# Patient Record
Sex: FEMALE | Race: WHITE
[De-identification: names, ages, dates, MRNs, and addresses within clinical notes are randomized per-mention and may not be internally consistent; named-entity substitution may affect disease eponyms.]

---

## 2018-02-13 ENCOUNTER — HOSPITAL ENCOUNTER (EMERGENCY)
Dept: HOSPITAL 25 - UCCORT | Age: 23
Discharge: HOME | End: 2018-02-13
Payer: COMMERCIAL

## 2018-02-13 VITALS — DIASTOLIC BLOOD PRESSURE: 82 MMHG | SYSTOLIC BLOOD PRESSURE: 127 MMHG

## 2018-02-13 DIAGNOSIS — B34.9: Primary | ICD-10-CM

## 2018-02-13 PROCEDURE — G0463 HOSPITAL OUTPT CLINIC VISIT: HCPCS

## 2018-02-13 PROCEDURE — 99201: CPT

## 2018-02-13 PROCEDURE — 87502 INFLUENZA DNA AMP PROBE: CPT

## 2018-02-13 NOTE — UC
Throat Pain/Nasal Rodolfo HPI





- HPI Summary


HPI Summary: 





sore throat x 3 days


+ fever, chills, body aches, 


+ cough , chest congestion 





- History of Current Complaint


Chief Complaint: UCRespiratory


Stated Complaint: CHEST RODOLFO/ST/RUNNY NOSE


Time Seen by Provider: 02/13/18 09:50


Hx Obtained From: Patient


Hx Last Menstrual Period: 02/01/18


Onset/Duration: Gradual Onset, Lasting Days - 3, Still Present


Severity: Moderate


Pain Intensity: 4


Cough: Nonproductive


Associated Signs & Symptoms: Positive: Nasal Discharge, Fever.  Negative: 

Wheezing, Rash





- Allergies/Home Medications


Allergies/Adverse Reactions: 


 Allergies











Allergy/AdvReac Type Severity Reaction Status Date / Time


 


No Known Allergies Allergy   Verified 02/13/18 09:35











Home Medications: 


 Home Medications





NK [No Home Medications Reported]  02/13/18 [History Confirmed 02/13/18]











PMH/Surg Hx/FS Hx/Imm Hx


Previously Healthy: Yes





- Surgical History


Surgical History: Yes


Surgery Procedure, Year, and Place: WISDOM TEETH REMOVED





- Family History


Known Family History: 


   Negative: Diabetes





- Social History


Alcohol Use: Weekly


Substance Use Type: None


Smoking Status (MU): Never Smoked Tobacco





Review of Systems


Constitutional: Fever, Chills, Fatigue


Skin: Negative


Eyes: Negative


ENT: Sore Throat, Nasal Discharge


Respiratory: Cough


Cardiovascular: Negative


Gastrointestinal: Negative


Genitourinary: Negative


Is Patient Immunocompromised?: No


All Other Systems Reviewed And Are Negative: Yes





Physical Exam


Triage Information Reviewed: Yes


Appearance: Well-Appearing, No Pain Distress, Well-Nourished


Vital Signs: 


 Initial Vital Signs











Temp  98.5 F   02/13/18 09:35


 


Pulse  75   02/13/18 09:35


 


Resp  18   02/13/18 09:35


 


BP  127/82   02/13/18 09:35


 


Pulse Ox  99   02/13/18 09:35











Vital Signs Reviewed: Yes


Eye Exam: Normal


Eyes: Positive: Conjunctiva Clear


ENT: Positive: Normal ENT inspection, Hearing grossly normal, Pharyngeal 

erythema, Nasal congestion, TMs normal


Neck exam: Normal


Neck: Positive: Supple, Nontender, No Lymphadenopathy


Respiratory: Positive: Chest non-tender, Lungs clear, Normal breath sounds, No 

respiratory distress


Cardiovascular: Positive: RRR, No Murmur, Pulses Normal


Abdominal Exam: Normal


Abdomen Description: Positive: Nontender, Soft


Bowel Sounds: Positive: Present


Neurological Exam: Normal


Skin Exam: Normal





Throat Pain/Nasal Course/Dx





- Differential Dx/Diagnosis


Provider Diagnoses: viral illness





Discharge





- Discharge Plan


Condition: Stable


Disposition: HOME


Patient Education Materials:  Viral Syndrome (ED)


Referrals: 


No Primary Care Phys,NOPCP [Primary Care Provider] - If Needed